# Patient Record
Sex: MALE | Race: WHITE | NOT HISPANIC OR LATINO | Employment: FULL TIME | URBAN - METROPOLITAN AREA
[De-identification: names, ages, dates, MRNs, and addresses within clinical notes are randomized per-mention and may not be internally consistent; named-entity substitution may affect disease eponyms.]

---

## 2024-05-18 ENCOUNTER — TRANSFERRED RECORDS (OUTPATIENT)
Dept: HEALTH INFORMATION MANAGEMENT | Facility: CLINIC | Age: 64
End: 2024-05-18

## 2024-06-29 ENCOUNTER — TRANSFERRED RECORDS (OUTPATIENT)
Dept: HEALTH INFORMATION MANAGEMENT | Facility: CLINIC | Age: 64
End: 2024-06-29

## 2024-07-25 ENCOUNTER — TELEPHONE (OUTPATIENT)
Dept: CARDIOLOGY | Facility: CLINIC | Age: 64
End: 2024-07-25
Payer: COMMERCIAL

## 2024-07-25 NOTE — TELEPHONE ENCOUNTER
7/25 Patient confirmed scheduled appointment:  Date: 8/20/2024  Time: 3:30 pm  Visit type: New EP  Provider: Jhonny  Location: [facility]  Testing/imaging: N/A  Additional notes: N/A

## 2024-07-25 NOTE — TELEPHONE ENCOUNTER
RECORDS RECEIVED FROM:    DATE RECEIVED:    NOTES STATUS DETAILS   OFFICE NOTE from referring provider  N/A    OFFICE NOTE from other cardiologists  Care Everywhere Dr. Olson 6-4-24 NM   RECORDS from hospital/ED Care Everywhere 8-4-23   MEDICATION LIST Internal    GENERAL CARDIO RECORDS   (ALL APPOINTMENT TYPES)     LABS (CBC,BMP,CMP, TSH) Care Everywhere    EKG (STRIPS & REPORTS) Care Everywhere 6-11-24   MONITORS (STRIPS & REPORTS) In process 7-8-24 Requested   ECHOS (IMAGES AND REPORTS) In process 11-13-23 Requested   STRESS TESTS (IMAGES AND REPORTS) In process 9-22-23 Requested   cMRI (IMAGES AND REPORTS) In process 5-14-24 Requested   Cardiac cath (IMAGES AND REPORTS) N/A    CT/CTA (IMAGES AND REPORTS) N/A    NEW EP     ICD/PACEMAKER IMPLANT No    CARDIOVERSION N/A    TILT TABLE STUDIES N/A      Action 7/25/24 [facility]     Action Taken Called NM to request zios 7-8-24 urgently. Appt got rescheduled while I was on the phone to a later date.    Sent zio to scanning 8/16     Action 7/25/24 [facility]  Fax #[phone]   Action Taken Requested:  cMRI  Echo  Nuc stress test 5-14-24 11-13-23, 8-4-23 9-22-23     Sent second request for images 8/16      Action 8.20.24 sv   Action Taken Received imgaing disc from [facility] - sent to 4th floor uplaoding   ECHO- 11.13.23, 8/4/23  NM stress- 9.22.23

## 2024-07-28 ENCOUNTER — HEALTH MAINTENANCE LETTER (OUTPATIENT)
Age: 64
End: 2024-07-28

## 2024-08-20 ENCOUNTER — PRE VISIT (OUTPATIENT)
Dept: CARDIOLOGY | Facility: CLINIC | Age: 64
End: 2024-08-20
Payer: COMMERCIAL

## 2024-08-20 ENCOUNTER — OFFICE VISIT (OUTPATIENT)
Dept: CARDIOLOGY | Facility: CLINIC | Age: 64
End: 2024-08-20
Payer: COMMERCIAL

## 2024-08-20 VITALS
WEIGHT: 252.2 LBS | DIASTOLIC BLOOD PRESSURE: 79 MMHG | OXYGEN SATURATION: 97 % | SYSTOLIC BLOOD PRESSURE: 120 MMHG | HEART RATE: 69 BPM

## 2024-08-20 DIAGNOSIS — I49.3 PVC'S (PREMATURE VENTRICULAR CONTRACTIONS): Primary | ICD-10-CM

## 2024-08-20 PROCEDURE — 93010 ELECTROCARDIOGRAM REPORT: CPT | Mod: GC | Performed by: INTERNAL MEDICINE

## 2024-08-20 PROCEDURE — 93005 ELECTROCARDIOGRAM TRACING: CPT

## 2024-08-20 PROCEDURE — 99213 OFFICE O/P EST LOW 20 MIN: CPT

## 2024-08-20 PROCEDURE — 99204 OFFICE O/P NEW MOD 45 MIN: CPT

## 2024-08-20 RX ORDER — SOTALOL HYDROCHLORIDE 120 MG/1
120 TABLET ORAL
COMMUNITY

## 2024-08-20 RX ORDER — METOPROLOL SUCCINATE 25 MG/1
1 TABLET, EXTENDED RELEASE ORAL DAILY
COMMUNITY
Start: 2024-05-16

## 2024-08-20 RX ORDER — LISINOPRIL 10 MG/1
10 TABLET ORAL DAILY
COMMUNITY
Start: 2023-08-05

## 2024-08-20 ASSESSMENT — PAIN SCALES - GENERAL: PAINLEVEL: NO PAIN (0)

## 2024-08-20 NOTE — PATIENT INSTRUCTIONS
You were seen in the Electrophysiology Clinic today by: Maria De Jesus HERNANDEZ    Plan:     Follow up Visit:  TBD       If you have further questions, please utilize [employer] to contact us.     Your Care Team:    EP Cardiology   Telephone Number     Nurse Line  Danae Dickens, RN   Anne Marie Baker, SOFÍA Zaragoza RN   [phone]     For scheduling appointments:   Gennaro   [phone]   For procedure scheduling:    Ana Luisa Morrow     [phone]   For the Device Clinic (Pacemakers, ICDs, Loop Recorders)    During business hours: [phone]  After business hours:   [phone]- select option 4 and ask for job code [id number].       On-call cardiologist for after hours or on weekends:   [phone], option #4, and ask to speak to the on-call cardiologist.     Cardiovascular Clinic:   [address] [place] [address]. [place] [address]      As always, Thank you for trusting us with your health care needs!

## 2024-08-20 NOTE — PROGRESS NOTES
ELECTROPHYSIOLOGY CLINIC VISIT    Assessment/Recommendations   Assessment/Plan:    Mark Laura is a 64 year old male with past medical history significant for HFmrEF, PVCs/NSVT and obesity.     Frequent PVCs   NSVT   PVC burden remains 30% on sotalol 120 mg BID, previously 45% on 80 mg BID. Patient reports significant weight gain and fatigue on sotalol and wishes to discuss alternative options. LVEF remains borderline reduced ~50%.  CMR 5/7/24 without ischemia, infiltrate or scar noted. We dicussed alternative AAD options. The other option discussed was an electrophysiology study (EPS) and possible ablation. The procedural risk of EPS and PVC ablation were discussed in detail. Risks discussed include: vascular complications, CVA, AVB, pericardial effusion and tamponade. We also discussed that if PVC burden is somewhat variable, it is possible that there would be sufficient amount of PVCs during procedure to allow for adequate mapping. We also discussed that at times the PVCs will be traced to an origin in the heart where it is not safe to proceed with ablation. He would prefer to proceed with ablation. Discussed with Dr Choe who is in agreement with above plan.     I discussed he can decrease sotalol to 80 mg BID if he would like since his PVC burden did not improve much on higher dose and he has noted some side effects.      NICM, LVEF ~45-50%  Etiology of mildly reduced LVEF thought possibly d/t PVC/dyssynchrony induced. Lexiscan and CMR without sign or ischemia/infarction. No infiltrate or scar on CMR to suggest other etiologies. No significant etoh use.    - He remains euvolemic    - Continue lisinopril and metoprolol     Follow up 3 months after ablation.      History of Present Illness/Subjective    Mr. Mark Laura is a 64 year old male who comes in today for EP follow-up of PVCs.    Patient admitted to [facility] 8/4/23 with new arrhythmia (PVCs and NSVT) noted during colonoscopy. Echo done at  this time with LVEF 47%. Lisinopril and sotalol 80 mg bid were started.   He was seen in clinic follow up 9/8/23, zio and stress test ordered at this time. Zio with 2.6% PVC burden, 146 NSVT runs, up to 10 beats. He was started on metoprolol succinate 25 mg daily. Stress test done 9/22/23, negative for ischemia, LVEF mildly reduced, PVCs noted during study.     He was seen in clinic follow up in January, discussed completion of CMR for further evaluation of mildly reduced LVEF and PVCs/NSVT. CMR done 5/7/24, LVEF 52%, no ischemia, infiltrate or scar noted. Continues to have frequent PVCs, repeat zio ordered with 45% PVC burden. He was last seen in clinic follow up on 6/4/24 at [facility], his sotalol was increased to 120 mg bid. Repeat zio done 6/29-6/20 with 29% PVC burden.     He presents today for consultation of PVCs. He reports he has never had symptoms with PVCs. He has noted fatigue and weight gain on sotalol that seems to have worsened with increasing dose to 120 mg daily. He otherwise denies chest discomfort, palpitations, abdominal fullness/bloating or peripheral edema, shortness of breath, orthopnea, lightheadedness, dizziness, pre-syncope, or syncope. Presenting 12 lead ECG shows sinus Vent Rate 69 bpm,  ms, QRS 84 ms, QTc 439 ms.     Family History    - Denies any cardiac history within his family   I have reviewed and updated the patient's Past Medical History, Social History, Family History and Medication List.     Cardiographics (Personally Reviewed) :   Zio Monitor   6/29-6/30/24 (1 day): sinus 77 bpm, 29% PVC burden, no NSVT runs noted     5/18-5/19/24 (1 day): sinus 76 bpm, 28% + 15% couplet PVCs = 45% PVC burden, 8 NSVT runs noted, 4 beats max duration     9/8-9/10/23 (2 days): sinus 72 bpm, 2.6 % PVCs, 146 NSVT, longest 10 bts     Echo: 11/13/2023   Interpretation Summary     * The left ventricle is mildly enlarged.     * The left ventricular systolic function is mildly reduced,  quantified   ejection fraction is 47%.     * Left ventricular wall motion is abnormal with mild global hypokinesis.     * The left ventricular diastolic function is mildly abnormal (Grade I). Left   atrial pressure elevated.     * There is mild aortic regurgitation.     * No pulmonary hypertension, estimated right ventricular systolic pressure   is 36 mmHg.     * Compared to prior study dated 08/04/2023 no significant change.     NM Stress Test: 9/22/2023   Summary    1. Pharmacologic nuclear stress test is negative for inducible ischemia.     2. Stress and rest SPECT images are normal.     3. Post stress left ventricular ejection fraction is mildly   decreased,estimated LVEF, 49 %.     4. Stress EKG is negative for ischemia. PVC's noted.     CMR:5/7/2024   IMPRESSION:   1. Normal left ventricular chamber size with borderline decreased (low-normal) systolic function. Calculated left ventricular ejection fraction is 52%. Presence of ventricular bigeminy reduces accuracy of EF calculation.   2. No regional wall motion abnormality.   3. Normal right ventricular size and systolic function. No regional wall motion abnormality or fibrofatty infiltration.   4. No significant valve disease.   5. T1 mapping demonstrates normal myocardial extracellular volume.   6. T2-weighted images do not demonstrate any evidence of myocardial edema/inflammation.   7. Normal late gadolinium enhancement images without evidence of myocardial infarction, fibrosis, or myocarditis.   8. Numerous liver lesions (bright on SSFP). Recommend dedicated abdominal imaging for further evaluation.            Physical Examination   /79 (BP Location: Right arm, Patient Position: Chair, Cuff Size: Adult Regular)   Pulse 69   Wt 114.4 kg (252 lb 3.2 oz)   SpO2 97%   Wt Readings from Last 3 Encounters:   08/20/24 114.4 kg (252 lb 3.2 oz)     General Appearance:   Alert, well-appearing and in no acute distress.   HEENT: Atraumatic, normocephalic. MMM.  "  Chest/Lungs:   Respirations unlabored.  Lungs are clear to auscultation.   Cardiovascular:   Regular rate and rhythm.  S1/S2. No murmur.    Abdomen:  Soft, nontender, nondistended.   Extremities: No cyanosis or clubbing. No edema.    Musculoskeletal: Moves all extremities.     Skin: Warm, dry, intact.    Neurologic: Mood and affect are appropriate.  Alert and oriented to person, place, time, and situation.          Medications  Allergies   Lisinopril 10 mg daily  Metoprolol 25 mg daily   Sotalol 120 mg bid    No Known Allergies      Lab Results (Personally Reviewed)    Chemistry/lipid CBC Cardiac Enzymes/BNP/TSH/INR   No results found for: \"CREATININE\", \"BUN\", \"NA\", \"CO2\"  No results found for: \"CR\"    No results found for: \"CHOL\", \"HDL\", \"LDL\", \"CHOLHDL\"   No results found for: \"WBC\", \"HGB\", \"HCT\", \"MCV\", \"PLT\" No results found for: \"CKTOTAL\", \"CKMB\", \"TROPONINI\", \"BNP\", \"TSH\", \"INR\"     The patient states understanding and is agreeable with the plan.     Maria De Jesus Barry PA-C  [facility]  Electrophysiology Consult Service  Pager: [phone]    I spent a total of 30 minutes face to face with Mark Laura during today's office visit. I have spent an additional 25 minutes today on chart review and documentation.                   "

## 2024-08-20 NOTE — NURSING NOTE
Chief Complaint   Patient presents with    New Patient     NEW- PVCs self referred?       Vitals were taken, medications reconciled and EKG performed.    Paige Thurner, Facilitator  3:40 PM

## 2024-08-20 NOTE — LETTER
8/20/2024      RE: Mark Laura  [id number]  [facility] [id number]       Dear Colleague,    Thank you for the opportunity to participate in the care of your patient, Mark Laura, at the [facility] [place] [facility]. Please see a copy of my visit note below.        ELECTROPHYSIOLOGY CLINIC VISIT    Assessment/Recommendations   Assessment/Plan:    Mark Laura is a 64 year old male with past medical history significant for HFmrEF, PVCs/NSVT and obesity.     Frequent PVCs   NSVT   PVC burden remains 30% on sotalol 120 mg BID, previously 45% on 80 mg BID. Patient reports significant weight gain and fatigue on sotalol and wishes to discuss alternative options. LVEF remains borderline reduced ~50%.  CMR 5/7/24 without ischemia, infiltrate or scar noted. We dicussed alternative AAD options. The other option discussed was an electrophysiology study (EPS) and possible ablation. The procedural risk of EPS and PVC ablation were discussed in detail. Risks discussed include: vascular complications, CVA, AVB, pericardial effusion and tamponade. We also discussed that if PVC burden is somewhat variable, it is possible that there would be sufficient amount of PVCs during procedure to allow for adequate mapping. We also discussed that at times the PVCs will be traced to an origin in the heart where it is not safe to proceed with ablation. He would prefer to proceed with ablation.     I discussed he can decrease sotalol to 80 mg BID if he would like since his PVC burden did not improve much on higher dose and he has noted some side effects.      NICM, LVEF ~45-50%  Etiology of mildly reduced LVEF thought possibly d/t PVC/dyssynchrony induced. Lexiscan and CMR without sign or ischemia/infarction. No infiltrate or scar on CMR to suggest other etiologies. No significant etoh use.    - He remains euvolemic    - Continue lisinopril and metoprolol      Follow up 3 months after ablation.      History of Present Illness/Subjective    Mr. Mark Laura is a 64 year old male who comes in today for EP follow-up of PVCs.    Patient admitted to [facility] 8/4/23 with new arrhythmia (PVCs and NSVT) noted during colonoscopy. Echo done at this time with LVEF 47%. Lisinopril and sotalol 80 mg bid were started.   He was seen in clinic follow up 9/8/23, zio and stress test ordered at this time. Zio with 2.6% PVC burden, 146 NSVT runs, up to 10 beats. He was started on metoprolol succinate 25 mg daily. Stress test done 9/22/23, negative for ischemia, LVEF mildly reduced, PVCs noted during study.     He was seen in clinic follow up in January, discussed completion of CMR for further evaluation of mildly reduced LVEF and PVCs/NSVT. CMR done 5/7/24, LVEF 52%, no ischemia, infiltrate or scar noted. Continues to have frequent PVCs, repeat zio ordered with 45% PVC burden. He was last seen in clinic follow up on 6/4/24 at [facility], his sotalol was increased to 120 mg bid. Repeat zio done 6/29-6/20 with 29% PVC burden.     He presents today for consultation of PVCs. He reports he has never had symptoms with PVCs. He has noted fatigue and weight gain on sotalol that seems to have worsened with increasing dose to 120 mg daily. He otherwise denies chest discomfort, palpitations, abdominal fullness/bloating or peripheral edema, shortness of breath, orthopnea, lightheadedness, dizziness, pre-syncope, or syncope. Presenting 12 lead ECG shows sinus Vent Rate 69 bpm,  ms, QRS 84 ms, QTc 439 ms.     Family History    - Denies any cardiac history within his family   I have reviewed and updated the patient's Past Medical History, Social History, Family History and Medication List.     Cardiographics (Personally Reviewed) :   Zio Monitor   6/29-6/30/24 (1 day): sinus 77 bpm, 29% PVC burden, no NSVT runs noted     5/18-5/19/24 (1 day): sinus 76 bpm, 28% + 15% couplet PVCs = 45%  PVC burden, 8 NSVT runs noted, 4 beats max duration     9/8-9/10/23 (2 days): sinus 72 bpm, 2.6 % PVCs, 146 NSVT, longest 10 bts     Echo: 11/13/2023   Interpretation Summary     * The left ventricle is mildly enlarged.     * The left ventricular systolic function is mildly reduced, quantified   ejection fraction is 47%.     * Left ventricular wall motion is abnormal with mild global hypokinesis.     * The left ventricular diastolic function is mildly abnormal (Grade I). Left   atrial pressure elevated.     * There is mild aortic regurgitation.     * No pulmonary hypertension, estimated right ventricular systolic pressure   is 36 mmHg.     * Compared to prior study dated 08/04/2023 no significant change.     NM Stress Test: 9/22/2023   Summary    1. Pharmacologic nuclear stress test is negative for inducible ischemia.     2. Stress and rest SPECT images are normal.     3. Post stress left ventricular ejection fraction is mildly   decreased,estimated LVEF, 49 %.     4. Stress EKG is negative for ischemia. PVC's noted.     CMR:5/7/2024   IMPRESSION:   1. Normal left ventricular chamber size with borderline decreased (low-normal) systolic function. Calculated left ventricular ejection fraction is 52%. Presence of ventricular bigeminy reduces accuracy of EF calculation.   2. No regional wall motion abnormality.   3. Normal right ventricular size and systolic function. No regional wall motion abnormality or fibrofatty infiltration.   4. No significant valve disease.   5. T1 mapping demonstrates normal myocardial extracellular volume.   6. T2-weighted images do not demonstrate any evidence of myocardial edema/inflammation.   7. Normal late gadolinium enhancement images without evidence of myocardial infarction, fibrosis, or myocarditis.   8. Numerous liver lesions (bright on SSFP). Recommend dedicated abdominal imaging for further evaluation.            Physical Examination   /79 (BP Location: Right arm, Patient  "Position: Chair, Cuff Size: Adult Regular)   Pulse 69   Wt 114.4 kg (252 lb 3.2 oz)   SpO2 97%   Wt Readings from Last 3 Encounters:   08/20/24 114.4 kg (252 lb 3.2 oz)     General Appearance:   Alert, well-appearing and in no acute distress.   HEENT: Atraumatic, normocephalic. MMM.   Chest/Lungs:   Respirations unlabored.  Lungs are clear to auscultation.   Cardiovascular:   Regular rate and rhythm.  S1/S2. No murmur.    Abdomen:  Soft, nontender, nondistended.   Extremities: No cyanosis or clubbing. No edema.    Musculoskeletal: Moves all extremities.     Skin: Warm, dry, intact.    Neurologic: Mood and affect are appropriate.  Alert and oriented to person, place, time, and situation.          Medications  Allergies   Lisinopril 10 mg daily  Metoprolol 25 mg daily   Sotalol 120 mg bid    No Known Allergies      Lab Results (Personally Reviewed)    Chemistry/lipid CBC Cardiac Enzymes/BNP/TSH/INR   No results found for: \"CREATININE\", \"BUN\", \"NA\", \"CO2\"  No results found for: \"CR\"    No results found for: \"CHOL\", \"HDL\", \"LDL\", \"CHOLHDL\"   No results found for: \"WBC\", \"HGB\", \"HCT\", \"MCV\", \"PLT\" No results found for: \"CKTOTAL\", \"CKMB\", \"TROPONINI\", \"BNP\", \"TSH\", \"INR\"     The patient states understanding and is agreeable with the plan.     Maria De Jesus Barry PA-C  [facility]  Electrophysiology Consult Service  Pager: [phone]    I spent a total of 30 minutes face to face with Mark Laura during today's office visit. I have spent an additional 25 minutes today on chart review and documentation.                     Please do not hesitate to contact me if you have any questions/concerns.     Sincerely,     Maria De Jesus Barry PA-C  "

## 2024-08-21 LAB
ATRIAL RATE - MUSE: 69 BPM
DIASTOLIC BLOOD PRESSURE - MUSE: NORMAL MMHG
INTERPRETATION ECG - MUSE: NORMAL
P AXIS - MUSE: 12 DEGREES
PR INTERVAL - MUSE: 150 MS
QRS DURATION - MUSE: 84 MS
QT - MUSE: 410 MS
QTC - MUSE: 439 MS
R AXIS - MUSE: -9 DEGREES
SYSTOLIC BLOOD PRESSURE - MUSE: NORMAL MMHG
T AXIS - MUSE: 13 DEGREES
VENTRICULAR RATE- MUSE: 69 BPM

## 2024-08-22 DIAGNOSIS — I49.3 PVC'S (PREMATURE VENTRICULAR CONTRACTIONS): Primary | ICD-10-CM

## 2024-08-22 RX ORDER — SODIUM CHLORIDE 9 MG/ML
INJECTION, SOLUTION INTRAVENOUS CONTINUOUS
OUTPATIENT
Start: 2024-08-22

## 2024-08-22 RX ORDER — LIDOCAINE 40 MG/G
CREAM TOPICAL
OUTPATIENT
Start: 2024-08-22

## 2024-08-28 ENCOUNTER — TELEPHONE (OUTPATIENT)
Dept: CARDIOLOGY | Facility: CLINIC | Age: 64
End: 2024-08-28
Payer: COMMERCIAL

## 2024-08-28 NOTE — TELEPHONE ENCOUNTER
EP Scheduling called the patient to schedule ablation with Dr. Choe. The number [phone] was left for the patient to return the call and schedule the procedure.    Ana Luisa Morrow  Periop Electrophysiology   [phone]

## 2024-12-11 ENCOUNTER — PATIENT OUTREACH (OUTPATIENT)
Dept: CARDIOLOGY | Facility: CLINIC | Age: 64
End: 2024-12-11
Payer: COMMERCIAL

## 2024-12-11 DIAGNOSIS — I49.3 PVC'S (PREMATURE VENTRICULAR CONTRACTIONS): Primary | ICD-10-CM

## 2024-12-11 RX ORDER — LISINOPRIL 10 MG/1
10 TABLET ORAL DAILY
Qty: 90 TABLET | Refills: 1 | Status: SHIPPED | OUTPATIENT
Start: 2024-12-11

## 2024-12-11 NOTE — PROGRESS NOTES
Called and spoke to patient to review pre-procedure instructions for upcoming PVC ablation procedure on 12/16/24. Writer reviewed medication holds in detail. He states is out of lisinopril; will send to [facility] pharmacy. Patient received letter and does not have any questions at this time.

## 2024-12-16 ENCOUNTER — HOSPITAL ENCOUNTER (OUTPATIENT)
Facility: CLINIC | Age: 64
Discharge: HOME | End: 2024-12-16
Attending: INTERNAL MEDICINE | Admitting: INTERNAL MEDICINE
Payer: COMMERCIAL

## 2024-12-16 ENCOUNTER — LAB (OUTPATIENT)
Dept: LAB | Facility: CLINIC | Age: 64
End: 2024-12-16
Attending: INTERNAL MEDICINE
Payer: COMMERCIAL

## 2024-12-16 VITALS
TEMPERATURE: 97.6 F | DIASTOLIC BLOOD PRESSURE: 94 MMHG | OXYGEN SATURATION: 98 % | HEART RATE: 82 BPM | RESPIRATION RATE: 25 BRPM | WEIGHT: 222.22 LBS | SYSTOLIC BLOOD PRESSURE: 129 MMHG

## 2024-12-16 DIAGNOSIS — I49.3 PVC'S (PREMATURE VENTRICULAR CONTRACTIONS): ICD-10-CM

## 2024-12-16 LAB
ACT BLD: 133 SECONDS (ref 74–150)
ACT BLD: 292 SECONDS (ref 74–150)
ACT BLD: 296 SECONDS (ref 74–150)
ACT BLD: 519 SECONDS (ref 74–150)
ANION GAP SERPL CALCULATED.3IONS-SCNC: 11 MMOL/L (ref 7–15)
BUN SERPL-MCNC: 22.5 MG/DL (ref 8–23)
CALCIUM SERPL-MCNC: 9 MG/DL (ref 8.8–10.4)
CHLORIDE SERPL-SCNC: 102 MMOL/L (ref 98–107)
CREAT SERPL-MCNC: 0.99 MG/DL (ref 0.67–1.17)
EGFRCR SERPLBLD CKD-EPI 2021: 86 ML/MIN/1.73M2
ERYTHROCYTE [DISTWIDTH] IN BLOOD BY AUTOMATED COUNT: 12.7 % (ref 10–15)
GLUCOSE SERPL-MCNC: 97 MG/DL (ref 70–99)
HCO3 SERPL-SCNC: 23 MMOL/L (ref 22–29)
HCT VFR BLD AUTO: 46.3 % (ref 40–53)
HGB BLD-MCNC: 16 G/DL (ref 13.3–17.7)
MCH RBC QN AUTO: 30.5 PG (ref 26.5–33)
MCHC RBC AUTO-ENTMCNC: 34.6 G/DL (ref 31.5–36.5)
MCV RBC AUTO: 88 FL (ref 78–100)
PLATELET # BLD AUTO: 188 10E3/UL (ref 150–450)
POTASSIUM SERPL-SCNC: 4.4 MMOL/L (ref 3.4–5.3)
RBC # BLD AUTO: 5.24 10E6/UL (ref 4.4–5.9)
SODIUM SERPL-SCNC: 136 MMOL/L (ref 135–145)
WBC # BLD AUTO: 7.8 10E3/UL (ref 4–11)

## 2024-12-16 PROCEDURE — C1894 INTRO/SHEATH, NON-LASER: HCPCS | Performed by: INTERNAL MEDICINE

## 2024-12-16 PROCEDURE — 93010 ELECTROCARDIOGRAM REPORT: CPT | Mod: XU | Performed by: INTERNAL MEDICINE

## 2024-12-16 PROCEDURE — 85018 HEMOGLOBIN: CPT | Performed by: INTERNAL MEDICINE

## 2024-12-16 PROCEDURE — 999N000054 HC STATISTIC EKG NON-CHARGEABLE

## 2024-12-16 PROCEDURE — 93662 INTRACARDIAC ECG (ICE): CPT | Mod: 26 | Performed by: INTERNAL MEDICINE

## 2024-12-16 PROCEDURE — 36415 COLL VENOUS BLD VENIPUNCTURE: CPT | Performed by: INTERNAL MEDICINE

## 2024-12-16 PROCEDURE — 250N000011 HC RX IP 250 OP 636: Performed by: INTERNAL MEDICINE

## 2024-12-16 PROCEDURE — 272N000001 HC OR GENERAL SUPPLY STERILE: Performed by: INTERNAL MEDICINE

## 2024-12-16 PROCEDURE — C1760 CLOSURE DEV, VASC: HCPCS | Performed by: INTERNAL MEDICINE

## 2024-12-16 PROCEDURE — 99152 MOD SED SAME PHYS/QHP 5/>YRS: CPT | Performed by: INTERNAL MEDICINE

## 2024-12-16 PROCEDURE — C1732 CATH, EP, DIAG/ABL, 3D/VECT: HCPCS | Performed by: INTERNAL MEDICINE

## 2024-12-16 PROCEDURE — 93654 COMPRE EP EVAL TX VT: CPT | Performed by: INTERNAL MEDICINE

## 2024-12-16 PROCEDURE — 85347 COAGULATION TIME ACTIVATED: CPT

## 2024-12-16 PROCEDURE — 99152 MOD SED SAME PHYS/QHP 5/>YRS: CPT | Mod: GC | Performed by: INTERNAL MEDICINE

## 2024-12-16 PROCEDURE — 93005 ELECTROCARDIOGRAM TRACING: CPT

## 2024-12-16 PROCEDURE — 93662 INTRACARDIAC ECG (ICE): CPT | Performed by: INTERNAL MEDICINE

## 2024-12-16 PROCEDURE — 258N000003 HC RX IP 258 OP 636: Performed by: INTERNAL MEDICINE

## 2024-12-16 PROCEDURE — 80048 BASIC METABOLIC PNL TOTAL CA: CPT | Performed by: INTERNAL MEDICINE

## 2024-12-16 PROCEDURE — 93654 COMPRE EP EVAL TX VT: CPT | Mod: GC | Performed by: INTERNAL MEDICINE

## 2024-12-16 PROCEDURE — C1759 CATH, INTRA ECHOCARDIOGRAPHY: HCPCS | Performed by: INTERNAL MEDICINE

## 2024-12-16 PROCEDURE — C1887 CATHETER, GUIDING: HCPCS | Performed by: INTERNAL MEDICINE

## 2024-12-16 PROCEDURE — 99153 MOD SED SAME PHYS/QHP EA: CPT | Performed by: INTERNAL MEDICINE

## 2024-12-16 PROCEDURE — 250N000009 HC RX 250: Performed by: INTERNAL MEDICINE

## 2024-12-16 RX ORDER — LIDOCAINE 40 MG/G
CREAM TOPICAL
Status: DISCONTINUED | OUTPATIENT
Start: 2024-12-16 | End: 2024-12-16 | Stop reason: HOSPADM

## 2024-12-16 RX ORDER — MIDAZOLAM HCL IN 0.9 % NACL/PF 1 MG/ML
1-4 PLASTIC BAG, INJECTION (ML) INTRAVENOUS CONTINUOUS
Status: DISCONTINUED | OUTPATIENT
Start: 2024-12-16 | End: 2024-12-16

## 2024-12-16 RX ORDER — IOPAMIDOL 755 MG/ML
INJECTION, SOLUTION INTRAVASCULAR
Status: DISCONTINUED | OUTPATIENT
Start: 2024-12-16 | End: 2024-12-16 | Stop reason: HOSPADM

## 2024-12-16 RX ORDER — SOTALOL HYDROCHLORIDE 80 MG/1
80 TABLET ORAL DAILY
Status: DISCONTINUED | OUTPATIENT
Start: 2024-12-16 | End: 2024-12-16 | Stop reason: HOSPADM

## 2024-12-16 RX ORDER — NALOXONE HYDROCHLORIDE 0.4 MG/ML
0.4 INJECTION, SOLUTION INTRAMUSCULAR; INTRAVENOUS; SUBCUTANEOUS
Status: DISCONTINUED | OUTPATIENT
Start: 2024-12-16 | End: 2024-12-16 | Stop reason: HOSPADM

## 2024-12-16 RX ORDER — LISINOPRIL 5 MG/1
10 TABLET ORAL DAILY
Status: DISCONTINUED | OUTPATIENT
Start: 2024-12-16 | End: 2024-12-16 | Stop reason: HOSPADM

## 2024-12-16 RX ORDER — METOPROLOL SUCCINATE 25 MG/1
25 TABLET, EXTENDED RELEASE ORAL DAILY
Status: DISCONTINUED | OUTPATIENT
Start: 2024-12-16 | End: 2024-12-16 | Stop reason: HOSPADM

## 2024-12-16 RX ORDER — NALOXONE HYDROCHLORIDE 0.4 MG/ML
0.2 INJECTION, SOLUTION INTRAMUSCULAR; INTRAVENOUS; SUBCUTANEOUS
Status: DISCONTINUED | OUTPATIENT
Start: 2024-12-16 | End: 2024-12-16 | Stop reason: HOSPADM

## 2024-12-16 RX ORDER — SODIUM CHLORIDE 9 MG/ML
INJECTION, SOLUTION INTRAVENOUS CONTINUOUS
Status: DISCONTINUED | OUTPATIENT
Start: 2024-12-16 | End: 2024-12-16 | Stop reason: HOSPADM

## 2024-12-16 RX ORDER — PROTAMINE SULFATE 10 MG/ML
INJECTION, SOLUTION INTRAVENOUS
Status: DISCONTINUED | OUTPATIENT
Start: 2024-12-16 | End: 2024-12-16 | Stop reason: HOSPADM

## 2024-12-16 RX ORDER — HEPARIN SODIUM 1000 [USP'U]/ML
INJECTION, SOLUTION INTRAVENOUS; SUBCUTANEOUS
Status: DISCONTINUED | OUTPATIENT
Start: 2024-12-16 | End: 2024-12-16 | Stop reason: HOSPADM

## 2024-12-16 RX ORDER — OXYCODONE AND ACETAMINOPHEN 5; 325 MG/1; MG/1
1 TABLET ORAL EVERY 4 HOURS PRN
Status: DISCONTINUED | OUTPATIENT
Start: 2024-12-16 | End: 2024-12-16 | Stop reason: HOSPADM

## 2024-12-16 RX ORDER — FENTANYL CITRATE 50 UG/ML
INJECTION, SOLUTION INTRAMUSCULAR; INTRAVENOUS
Status: DISCONTINUED | OUTPATIENT
Start: 2024-12-16 | End: 2024-12-16 | Stop reason: HOSPADM

## 2024-12-16 RX ADMIN — SODIUM CHLORIDE: 9 INJECTION, SOLUTION INTRAVENOUS at 11:00

## 2024-12-16 RX ADMIN — Medication 2 MG/HR: at 13:52

## 2024-12-16 ASSESSMENT — ACTIVITIES OF DAILY LIVING (ADL)
ADLS_ACUITY_SCORE: 41

## 2024-12-16 NOTE — Clinical Note
dry, intact, no bleeding and no hematoma. The right femoral artery 8.5 Romansh sheath is removed to an 8 FR Angioseal, to hemostasis.

## 2024-12-16 NOTE — H&P
Electrophysiology Pre-Procedure History and Physical    Mark Laura MRN# [id number]   Age: 64 year old YOB: 1960      Date of Procedure: 12/16/2024  [facility]      Date of Exam 12/16/2024 Facility (Same day)       HPI:  Mr. Laura is a 64 year old male with past medical history significant for HFmrEF, PVCs/NSVT and obesity. He was admitted to [facility] 8/4/23 with new arrhythmia (PVCs and NSVT) noted during colonoscopy. Echo done at this time with LVEF 47%. Lisinopril and sotalol 80 mg bid were started.   He was seen in clinic follow up 9/8/23, zio and stress test ordered at this time. Zio with 2.6% PVC burden, 146 NSVT runs, up to 10 beats. He was started on metoprolol succinate 25 mg daily. Stress test done 9/22/23, negative for ischemia, LVEF mildly reduced, PVCs noted during study. CMR done 5/7/24, LVEF 52%, no ischemia, infiltrate or scar noted. Continues to have frequent PVCs, repeat zio ordered with 45% PVC burden. He was last seen in clinic follow up on 6/4/24 at [facility], his sotalol was increased to 120 mg bid. Repeat zio done 6/29-6/20 with 29% PVC burden. Given he had some side effects and PVC burden did not decrease on higher Sotalol doses, his Sotalol was decreased back to 80 mg BID. We dicussed alternative AAD options. The other option discussed was an electrophysiology study (EPS) and possible ablation.   He elected to pursue ablation for which he presents today.        Active problem list:     There are no active problems to display for this patient.           Medications (include herbals and vitamins):      Current Facility-Administered Medications   Medication Dose Route Frequency Provider Last Rate Last Admin    lidocaine (LMX4) cream   Topical Q1H Soy Haines MD        lidocaine 1 % 0.1-1 mL  0.1-1 mL Other Q1H Soy Haines MD        sodium chloride (PF) 0.9% PF flush 3 mL  3 mL Intracatheter Q8H Daija  MD Soy        sodium chloride (PF) 0.9% PF flush 3 mL  3 mL Intracatheter Q1H PRN Soy Choe MD        sodium chloride (PF) 0.9% PF flush 3 mL  3 mL Intracatheter q1 min prn Soy Choe MD        sodium chloride 0.9 % infusion   Intravenous Continuous Soy Choe MD 30 mL/hr at 12/16/24 1100 New Bag at 12/16/24 1100           Medication List      There are no discharge medications for this visit.              Allergies:    No Known Allergies          Social History:     Social History     Tobacco Use    Smoking status: Never    Smokeless tobacco: Never   Substance Use Topics    Alcohol use: Not on file            Physical Exam:   All vitals have been reviewed  Patient Vitals for the past 8 hrs:   BP Temp Temp src Pulse Resp SpO2 Weight   12/16/24 1035 (!) 142/99 98.5  F (36.9  C) Oral 81 16 98 % 100.8 kg (222 lb 3.6 oz)     No intake/output data recorded.  Airway assessment:   Patient is able to open mouth wide  Patient is able to stick out tongue}      ENT:   Normocephalic, without obvious abnormality, atraumatic, sinuses nontender on palpation, external ears without lesions, oral pharynx with moist mucous membranes, tonsils without erythema or exudates, gums normal and good dentition.     Neck:   Supple, symmetrical, trachea midline, no adenopathy, thyroid symmetric, not enlarged and no tenderness, skin normal     Lungs:   No increased work of breathing, good air exchange, clear to auscultation bilaterally, no crackles or wheezing     Cardiovascular:   Normal apical impulse, regular rate and rhythm, normal S1 and S2, no S3 or S4, and no murmur noted             Lab / Radiology Results:     Lab Results   Component Value Date    WBC 7.8 12/16/2024    RBC 5.24 12/16/2024    HGB 16.0 12/16/2024    HCT 46.3 12/16/2024    MCV 88 12/16/2024    RDW 12.7 12/16/2024     12/16/2024      Lab Results   Component Value Date    WBC 7.8 12/16/2024     Lab Results   Component Value Date     12/16/2024      Lab Results   Component Value Date    HGB 16.0 12/16/2024    HCT 46.3 12/16/2024     Lab Results   Component Value Date     12/16/2024    CO2 23 12/16/2024    BUN 22.5 12/16/2024     Lab Results   Component Value Date     12/16/2024    CO2 23 12/16/2024    BUN 22.5 12/16/2024            Plan:   Patient's active problems diagnostically and therapeutically optimized for the planned procedure. Patient here for PVC ablation. Procedure explained in detail to patient including indications, risks, and benefits. We also discussed ablation and its indications, risks, and benefits. Complications include but are not limited to vascular injury, excessive bleeding requiring blood transfusion, groin hematoma, aortic injury at the time of transseptal puncture, bleeding/injury to abdominal structures at time of epicardial access, cardiac tamponade requiring pericardiocentesis or open heart surgery, and thromboembolic complications or strokes. We also discussed that PVC ablation can be limited by inducibility of PVC at the time of EPS/Abaltion and/or the origin of PVC. Efficacy of ablation also deceases if multiple foci are found. After an extensive discussion, the patient would like to pursue ablation in attempts to improve PVC burden and symptoms.  Patient states understanding and wishes to procced.     MIKE Huffman CNP  Electrophysiology Consult Service  Securely message with [employer]   Text page via [employer] Paging/Directory

## 2024-12-16 NOTE — PRE-PROCEDURE
GENERAL PRE-PROCEDURE:   Procedure:  Premature ventricular contraction ablation  Date/Time:  12/16/2024 12:23 PM    Written consent obtained?: Yes    Risks and benefits: Risks, benefits and alternatives were discussed    Consent given by:  Patient  Patient states understanding of procedure being performed: Yes    Patient's understanding of procedure matches consent: Yes    Procedure consent matches procedure scheduled: Yes    Expected level of sedation:  Moderate  Appropriately NPO:  Yes  ASA Class:  3  Mallampati  :  Grade 2- soft palate, base of uvula, tonsillar pillars, and portion of posterior pharyngeal wall visible  Lungs:  Lungs clear with good breath sounds bilaterally  Heart:  Normal heart sounds and rate  History & Physical reviewed:  History and physical reviewed and no updates needed  Statement of review:  I have reviewed the lab findings, diagnostic data, medications, and the plan for sedation

## 2024-12-16 NOTE — PROGRESS NOTES
D/I/A: Pt roomed on 2A, room 12.  Arrived via litter and accompanied by RN off monitor.  VSSA.  Rhythm upon arrival: NSR on monitor.  Denies pain or sob.  Reviewed activity restrictions and when to notify RN, ie-changes to breathing or increased chest pressure or chest pain.  CCL access: Bilateral groin sites with figure 8 stitch-soft, dry and intact. Right groin arterial access site closed with angio seal. CMS intact. Pt on 2 hour flat bedrest. Wife updated by MD at bedside.   P: Continue to monitor status.  Discharge to home once meeting criteria.

## 2024-12-16 NOTE — Clinical Note
dry, intact, no bleeding and no hematoma. The venous sheaths are removed from the right and the left femoral veins, to a figure-8 suture closure and to hemostasis.

## 2024-12-16 NOTE — Clinical Note
Report called to 2A RNAlma. The procedure, outcome, and post-status is reviewed. The pt is returned to 2A per stretcher in stable condition.

## 2024-12-16 NOTE — PROGRESS NOTES
Pt arrived to Unit 2a for SVR ablation procedure in EP lab. AFVSS. Denies pain. Labs resulted. Bilat pp & pt pulses palpable/marked. Pt's wife, Rahel, at bedside; to drive pt home post procedure. Pt stable, ready for consent.

## 2024-12-16 NOTE — DISCHARGE INSTRUCTIONS
Post-Ablation Discharge Instructions    Plan: Discontinue Sotalol.     Follow up with Dr Choe in 3 months.     After you go home:  Have an adult stay with you for 24 hours.  Drink plenty of fluids.  You may eat your normal diet, unless your doctor tells you otherwise.  For 24 hours:  - Relax and take it easy.  - Do NOT smoke.  - Do NOT make any important or legal decisions.  - Do NOT drive or operate machines at home or at work.  - Do NOT drink alcohol.    Care of groin site:        Remove the Dressings after 24 hours. You may shower at that time. Do not scrub the procedure site vigorously. If there is minor oozing, apply another Band-aid and remove it after 12 hours.         Do NOT take a bath, use a hot tub, pool, or submerse in water for at least 3 days You may shower.         It is normal to have a small bruise or lump at the site.         Do not use lotion or powder near the puncture site for 3 days.        For the first 2 days: Do not stoop or squat. When you cough, sneeze or move your bowels, hold your hand over the puncture site and press gently.        Do not lift more than 10 pounds or exertional activity for 10 days.      If you start bleeding from the site in your groin:  Lie down flat and press firmly on the site.  Call your physician immediately, or, come to the emergency room.    Call 911 right away if you have bleeding that is heavy or does not stop.     Call your doctor/provider if:        You have a large or growing hard lump around the site.        The site is red, swollen, hot or tender.        Blood or fluid is draining from the site.        You have chills or a fever greater than 101 F (38 C).        Your leg or arm turns bluish, feels numb or cool.        You have hives, a rash or unusual itching.     Cardiovascular Clinic:   [address] [facility] [place] [address]    Your Care Team:  EP Cardiology   Telephone Number     Nurses:   Anne Marie BROOKE [phone]    After  business hours: [phone], select option 4, and ask for EP Fellow on-call to be paged.   Non-procedure scheduling:    Louisa CHARLES   [phone]   Procedure scheduling:    Ana Luisa Morrow   [phone]   Device Clinic (Pacemakers, ICDs, Loop Recorders)    During business hours: [phone]  After business hours:   [phone]- select option 4 and ask for job code [id number].       As always, Thank you for trusting us with your health care needs

## 2024-12-17 LAB
ATRIAL RATE - MUSE: 83 BPM
ATRIAL RATE - MUSE: 84 BPM
DIASTOLIC BLOOD PRESSURE - MUSE: NORMAL MMHG
DIASTOLIC BLOOD PRESSURE - MUSE: NORMAL MMHG
INTERPRETATION ECG - MUSE: NORMAL
INTERPRETATION ECG - MUSE: NORMAL
P AXIS - MUSE: 22 DEGREES
P AXIS - MUSE: 45 DEGREES
PR INTERVAL - MUSE: 144 MS
PR INTERVAL - MUSE: 146 MS
QRS DURATION - MUSE: 80 MS
QRS DURATION - MUSE: 88 MS
QT - MUSE: 364 MS
QT - MUSE: 384 MS
QTC - MUSE: 453 MS
QTC - MUSE: 501 MS
R AXIS - MUSE: -11 DEGREES
R AXIS - MUSE: 4 DEGREES
SYSTOLIC BLOOD PRESSURE - MUSE: NORMAL MMHG
SYSTOLIC BLOOD PRESSURE - MUSE: NORMAL MMHG
T AXIS - MUSE: 28 DEGREES
T AXIS - MUSE: 8 DEGREES
VENTRICULAR RATE- MUSE: 114 BPM
VENTRICULAR RATE- MUSE: 84 BPM

## 2024-12-17 NOTE — PROGRESS NOTES
Pt completed 2 hours flat bedrest without any complications. Vitals stable. Rhythm: NSR. Denies any pain. Bilateral figure 8 stitches removed at the completion of bedrest and sites covered with guaze and tegaderm. Bilateral groin sites remained soft, dry and intact before and post ambulation. CMS intact. Pt ate/drank, voided and ambulated in the okeefe with steady gait. Discharge instructions were reviewed with pt and spouse and they verbalizes understanding. Copy of AVS given along with angioseal brochure. PIV removed. Pt escorted via wheelchair to his wife's car. Pt has all his belongings.

## 2025-02-27 NOTE — PROGRESS NOTES
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      Frequent PVCs   NSVT   PVC burden on zio 29% on sotalol 120 mg BID, previously 45% on 80 mg BID. Patient reported significant weight gain and fatigue on sotalol and wishes to discuss alternative options. CMR 5/7/24 without ischemia, infiltrate or scar noted, EF 52%. Patient ultimately underwent PVC ablation 12/16/24 with Dr. Choe. Sotalol discontinued post ablation. Groin site well healed. No PVCs on EKG today. Will complete zio patch to assess post ablation PVC burden. If PVC burden improved, will repeat echo to assess for improvement in LV function.      NICM, LVEF ~45-50%  1. ACEi/ARB/ARNi: Continue lisinopril.  2. BB: Continue metoprolol XL. Could consider stopping metoprolol if heart function normalized on repeat echo.   3. Aldosterone antagonist: Not currently on.  4. SGLT2i: Not currently on.  5.  SCD prophylaxis: Not indicated at this time d/t EF 52% on CMR.   6. Fluid status: euvolemic on exam  7. Etiology: Etiology of mildly reduced LVEF thought possibly d/t PVC/dyssynchrony induced. Lexiscan and CMR without sign or ischemia/infarction. No infiltrate or scar on CMR to suggest other etiologies. No significant etoh use. Now s/p PVC ablation 12/16/24.   8. BP: Patient reports BP at home 110s-120s/70s-80s. BP in clinic today 127/82.    Follow up in one year, or sooner as needed.        History of Present Illness/Subjective    Mr. Mark Laura is a 64 year old male who comes in today for EP follow-up of PVCs s/p PVC ablation 12/16/24.    Mark Laura is a 64 year old male with past medical history significant for HFmrEF (EF 45-50%), PVCs/NSVT s/p PVC ablation (12/16/24) and obesity.     Patient admitted to [facility] 8/4/23 with new arrhythmia (PVCs and NSVT) noted  during colonoscopy. Echo done at this time with LVEF 47%. Lisinopril and sotalol 80 mg bid were started. He was seen in clinic follow up 9/8/23, zio and stress test ordered at this time. Zio with 2.6% PVC burden, 146 NSVT runs, up to 10 beats. He was started on metoprolol succinate 25 mg daily. Stress test done 9/22/23, negative for ischemia, LVEF mildly reduced, PVCs noted during study.      He was seen in clinic follow up in January 2024, discussed completion of CMR for further evaluation of mildly reduced LVEF and PVCs/NSVT. CMR done 5/7/24, LVEF 52%, no ischemia, infiltrate or scar noted. Continues to have frequent PVCs, repeat zio ordered with 45% PVC burden. He was last seen in clinic follow up on 6/4/24 at [facility], his sotalol was increased to 120 mg bid. Repeat zio done 6/29-6/20 with 29% PVC burden.      EP Visit 8/20/24: He presents today for consultation of PVCs. He reports he has never had symptoms with PVCs. He has noted fatigue and weight gain on sotalol that seems to have worsened with increasing dose to 120 mg daily. He otherwise denies chest discomfort, palpitations, abdominal fullness/bloating or peripheral edema, shortness of breath, orthopnea, lightheadedness, dizziness, pre-syncope, or syncope. Presenting 12 lead ECG shows sinus Vent Rate 69 bpm,  ms, QRS 84 ms, QTc 439 ms.     He presents today in follow up. He reports feeling well. He states his groin site has healed well post ablation. He reports improvement in symptoms, denies recent palpitations, and states he has not seen PVCs on watch recently. He otherwise denies chest discomfort, abdominal fullness/bloating or peripheral edema, shortness of breath, paroxysmal nocturnal dyspnea, orthopnea, lightheadedness, dizziness, pre-syncope, or syncope. Patient also wanted to discuss possibility of stopping medications post ablation. BP at home 110s-120s/70s-80s. Presenting 12 lead ECG shows sinus rhythm Vent Rate 84 bpm,  ms, QRS  82 ms, QTc 420 ms. Current cardiac medications include: lisinopril 10 mg, metoprolol XL 25 mg.     I have reviewed and updated the patient's Past Medical History, Social History, Family History and Medication List.     Cardiographics (Personally Reviewed) :   Zio Monitor   6/29-6/30/24 (1 day): sinus 77 bpm, 29% PVC burden, no NSVT runs noted      5/18-5/19/24 (1 day): sinus 76 bpm, 28% + 15% couplet PVCs = 45% PVC burden, 8 NSVT runs noted, 4 beats max duration      9/8-9/10/23 (2 days): sinus 72 bpm, 2.6 % PVCs, 146 NSVT, longest 10 bts      Echo: 11/13/2023   Interpretation Summary     * The left ventricle is mildly enlarged.     * The left ventricular systolic function is mildly reduced, quantified   ejection fraction is 47%.     * Left ventricular wall motion is abnormal with mild global hypokinesis.     * The left ventricular diastolic function is mildly abnormal (Grade I). Left   atrial pressure elevated.     * There is mild aortic regurgitation.     * No pulmonary hypertension, estimated right ventricular systolic pressure   is 36 mmHg.     * Compared to prior study dated 08/04/2023 no significant change.      NM Stress Test: 9/22/2023   Summary    1. Pharmacologic nuclear stress test is negative for inducible ischemia.     2. Stress and rest SPECT images are normal.     3. Post stress left ventricular ejection fraction is mildly   decreased,estimated LVEF, 49 %.     4. Stress EKG is negative for ischemia. PVC's noted.      CMR:5/7/2024   IMPRESSION:   1. Normal left ventricular chamber size with borderline decreased (low-normal) systolic function. Calculated left ventricular ejection fraction is 52%. Presence of ventricular bigeminy reduces accuracy of EF calculation.   2. No regional wall motion abnormality.   3. Normal right ventricular size and systolic function. No regional wall motion abnormality or fibrofatty infiltration.   4. No significant valve disease.   5. T1 mapping demonstrates normal  "myocardial extracellular volume.   6. T2-weighted images do not demonstrate any evidence of myocardial edema/inflammation.   7. Normal late gadolinium enhancement images without evidence of myocardial infarction, fibrosis, or myocarditis.   8. Numerous liver lesions (bright on SSFP). Recommend dedicated abdominal imaging for further evaluation.        Physical Examination   /82 (BP Location: Right arm, Patient Position: Chair, Cuff Size: Adult Regular)   Pulse 80   Ht 1.753 m (5' 9\")   Wt 106.1 kg (234 lb)   SpO2 98%   BMI 34.56 kg/m    Wt Readings from Last 3 Encounters:   03/04/25 106.1 kg (234 lb)   12/16/24 100.8 kg (222 lb 3.6 oz)   08/20/24 114.4 kg (252 lb 3.2 oz)     General Appearance:   Alert, well-appearing and in no acute distress.   HEENT: Atraumatic, normocephalic.    Chest/Lungs:   Respirations unlabored.  Lungs are clear to auscultation.   Cardiovascular:   Regular rate and rhythm.  S1/S2. No murmur.    Abdomen:  Soft, nontender, nondistended.   Extremities: No cyanosis or clubbing. No edema.     Musculoskeletal: Moves all extremities.     Skin: Warm, dry, intact.    Neurologic: Mood and affect are appropriate.  Alert and oriented to person, place, time, and situation.          Medications  Allergies   Current Outpatient Medications   Medication Sig Dispense Refill    lisinopril (ZESTRIL) 10 MG tablet Take 1 tablet (10 mg) by mouth daily. 90 tablet 1    metoprolol succinate ER (TOPROL XL) 25 MG 24 hr tablet Take 1 tablet by mouth daily      sotalol (BETAPACE) 120 MG tablet 120 mg      No Known Allergies      Lab Results (Personally Reviewed)    Chemistry/lipid CBC Cardiac Enzymes/BNP/TSH/INR   Lab Results   Component Value Date    BUN 22.5 12/16/2024     12/16/2024    CO2 23 12/16/2024     Creatinine   Date Value Ref Range Status   12/16/2024 0.99 0.67 - 1.17 mg/dL Final       No results found for: \"CHOL\", \"HDL\", \"LDL\", \"CHOLHDL\"   Lab Results   Component Value Date    WBC 7.8 " "12/16/2024    HGB 16.0 12/16/2024    HCT 46.3 12/16/2024    MCV 88 12/16/2024     12/16/2024    No results found for: \"CKTOTAL\", \"CKMB\", \"TROPONINI\", \"BNP\", \"TSH\", \"INR\"     The patient states understanding and is agreeable with the plan.     Libia Alfonso PA-C  [facility]  Electrophysiology Consult Service  Pager #[phone]    I spent a total of 15 minutes face to face with Mark Laura during today's office visit. I have spent an additional 15 minutes today on chart review and documentation.                 "

## 2025-03-04 ENCOUNTER — OFFICE VISIT (OUTPATIENT)
Dept: CARDIOLOGY | Facility: CLINIC | Age: 65
End: 2025-03-04
Payer: COMMERCIAL

## 2025-03-04 VITALS
HEART RATE: 80 BPM | BODY MASS INDEX: 34.66 KG/M2 | HEIGHT: 69 IN | WEIGHT: 234 LBS | OXYGEN SATURATION: 98 % | SYSTOLIC BLOOD PRESSURE: 127 MMHG | DIASTOLIC BLOOD PRESSURE: 82 MMHG

## 2025-03-04 DIAGNOSIS — I49.3 PVC'S (PREMATURE VENTRICULAR CONTRACTIONS): Primary | ICD-10-CM

## 2025-03-04 LAB
ATRIAL RATE - MUSE: 84 BPM
DIASTOLIC BLOOD PRESSURE - MUSE: NORMAL MMHG
INTERPRETATION ECG - MUSE: NORMAL
P AXIS - MUSE: 40 DEGREES
PR INTERVAL - MUSE: 152 MS
QRS DURATION - MUSE: 82 MS
QT - MUSE: 356 MS
QTC - MUSE: 420 MS
R AXIS - MUSE: 5 DEGREES
SYSTOLIC BLOOD PRESSURE - MUSE: NORMAL MMHG
T AXIS - MUSE: 6 DEGREES
VENTRICULAR RATE- MUSE: 84 BPM

## 2025-03-04 PROCEDURE — 3079F DIAST BP 80-89 MM HG: CPT

## 2025-03-04 PROCEDURE — 93000 ELECTROCARDIOGRAM COMPLETE: CPT | Mod: XE

## 2025-03-04 PROCEDURE — 93242 EXT ECG>48HR<7D RECORDING: CPT

## 2025-03-04 PROCEDURE — 99214 OFFICE O/P EST MOD 30 MIN: CPT | Mod: 25

## 2025-03-04 PROCEDURE — 3074F SYST BP LT 130 MM HG: CPT

## 2025-03-04 RX ORDER — METOPROLOL SUCCINATE 25 MG/1
25 TABLET, EXTENDED RELEASE ORAL DAILY
Qty: 90 TABLET | Refills: 1 | Status: SHIPPED | OUTPATIENT
Start: 2025-03-04

## 2025-03-04 RX ORDER — LISINOPRIL 10 MG/1
10 TABLET ORAL DAILY
Qty: 90 TABLET | Refills: 1 | Status: SHIPPED | OUTPATIENT
Start: 2025-03-04

## 2025-03-04 NOTE — NURSING NOTE
Cardiac Monitors:   -7 day o     Cardiac Testing:   -Complete an echocardiogram     Med Reconcile: Reviewed and verified all current medications with the patient. The updated medication list was printed and given to the patient./ No medication changes. Refill sent.       Return Appointment:   -Follow-up in 1 year     Patient stated he understood all health information given and agreed to call with further questions or concerns.

## 2025-03-04 NOTE — PROGRESS NOTES
Mark Laura arrived here on 3/4/2025 8:43 AM for 7 DAYS  Zio monitor placement per ordering provider HSANIA  for the diagnosis PVC'S.  Patient s skin was prepped per protocol. Dr. JAUREGUI is the supervising MD.  Zio monitor was placed.  Instructions were reviewed with and given to the patient.  Patient verbalized understanding of wear, troubleshooting and monitor return instructions.

## 2025-03-04 NOTE — NURSING NOTE
"Chief Complaint   Patient presents with    Follow Up     Post ablation        Initial /82 (BP Location: Right arm, Patient Position: Chair, Cuff Size: Adult Regular)   Pulse 80   Ht 1.753 m (5' 9\")   Wt 106.1 kg (234 lb)   SpO2 98%   BMI 34.56 kg/m   Estimated body mass index is 34.56 kg/m  as calculated from the following:    Height as of this encounter: 1.753 m (5' 9\").    Weight as of this encounter: 106.1 kg (234 lb)..  BP completed using cuff size: rahul RENEE  "

## 2025-03-04 NOTE — PATIENT INSTRUCTIONS
Take your medicines every day, as directed     Changes made today:  No medication changes   7 day Zio patch  Complete an echocardiogram        Cardiology Care Coordinators:      Hilda RIOS RN     Cardiology Rooming Staff:  Mona BLACK EMT    Phone  [phone]      Fax [phone]    To Contact us     During Business Hours:  [phone]     If you are needing refills please contact your pharmacy.     For urgent after hour care please call the [facility] at [phone] or the [facility] at [phone] and ask to speak to the cardiologist on call.    If you are having a medical emergency, please call 911.            HOW TO CHECK YOUR BLOOD PRESSURE AT HOME:     Avoid eating, smoking, and exercising for at least 30 minutes before taking a reading.     Be sure you have taken your BP medication at least 2-3 hours before you check it.      Sit quietly for 10 minutes before a reading.      Sit in a chair with your feet flat on the floor. Rest your  arm on a table so that the arm cuff is at the same level as your heart.     Remain still during the reading.  Record your blood pressure and pulse in a log and bring to your next appointment.       Use [employer] allows you to communicate directly with your heart team through secure messaging.  The [employer] can be accessed any time on your phone, computer, or tablet.  If you need assistance, we'd be happy to help!             Keep your Heart Appointments:     Follow-up in 1 year

## 2025-03-15 LAB — CV ZIO PRELIM RESULTS: NORMAL

## 2025-03-24 ENCOUNTER — ANCILLARY PROCEDURE (OUTPATIENT)
Dept: CARDIOLOGY | Facility: CLINIC | Age: 65
End: 2025-03-24
Payer: COMMERCIAL

## 2025-03-24 DIAGNOSIS — I49.3 PVC'S (PREMATURE VENTRICULAR CONTRACTIONS): ICD-10-CM

## 2025-03-24 LAB — LVEF ECHO: NORMAL

## 2025-03-24 PROCEDURE — 93306 TTE W/DOPPLER COMPLETE: CPT | Performed by: INTERNAL MEDICINE

## 2025-08-10 ENCOUNTER — HEALTH MAINTENANCE LETTER (OUTPATIENT)
Age: 65
End: 2025-08-10

## (undated) DEVICE — INTRO CATH 12CM 8.5FR FST-CATH

## (undated) DEVICE — INTRODUCER SHEATH FAST-CATH CATH-LOCK 7FRX12CM 406702

## (undated) DEVICE — PACK HEART LEFT CUSTOM

## (undated) DEVICE — CATH SOUNDSTAR 8FRX90CM 10439011

## (undated) DEVICE — RAD CLOSURE ANGIOSEAL 8FR  610131

## (undated) DEVICE — KIT MICROINTRODUCER VASCULAR VSI 4FRX0.018INX40CM 7195X

## (undated) DEVICE — CATH EP 7FR X 115CM DECANAV CA

## (undated) DEVICE — INTRODUCER SHEATH FAST-CATH 9FRX12CM 406116

## (undated) DEVICE — INTRO SHEATH 4FRX10CM PINNACLE RSS402

## (undated) DEVICE — INTRODUCER SHEATH FAST-CATH CATH-LOCK 8FRX12CM 406706

## (undated) DEVICE — ELECTRODE DEFIB CADENCE 22550R

## (undated) DEVICE — CATH THERMOCOOL SMARTTOUCH SF DF CURVE

## (undated) DEVICE — TUBE SET SMARKABLATE IRRIGATION

## (undated) DEVICE — PATCH CARTO 3 EXTERNAL REFERENCE 3D MAPPING CREFP6

## (undated) RX ORDER — FENTANYL CITRATE 50 UG/ML
INJECTION, SOLUTION INTRAMUSCULAR; INTRAVENOUS
Status: DISPENSED
Start: 2024-12-16

## (undated) RX ORDER — PROTAMINE SULFATE 10 MG/ML
INJECTION, SOLUTION INTRAVENOUS
Status: DISPENSED
Start: 2024-12-16

## (undated) RX ORDER — BUPIVACAINE HYDROCHLORIDE 5 MG/ML
INJECTION, SOLUTION EPIDURAL; INTRACAUDAL
Status: DISPENSED
Start: 2024-12-16

## (undated) RX ORDER — MIDAZOLAM HCL IN 0.9 % NACL/PF 1 MG/ML
PLASTIC BAG, INJECTION (ML) INTRAVENOUS
Status: DISPENSED
Start: 2024-12-16